# Patient Record
Sex: MALE | Race: AMERICAN INDIAN OR ALASKA NATIVE | URBAN - METROPOLITAN AREA
[De-identification: names, ages, dates, MRNs, and addresses within clinical notes are randomized per-mention and may not be internally consistent; named-entity substitution may affect disease eponyms.]

---

## 2021-11-23 ENCOUNTER — OFFICE VISIT (OUTPATIENT)
Dept: URBAN - METROPOLITAN AREA CLINIC 85 | Facility: CLINIC | Age: 71
End: 2021-11-23
Payer: MEDICARE

## 2021-11-23 DIAGNOSIS — H43.813 VITREOUS DEGENERATION, BILATERAL: ICD-10-CM

## 2021-11-23 PROCEDURE — 92134 CPTRZ OPH DX IMG PST SGM RTA: CPT | Performed by: OPTOMETRIST

## 2021-11-23 PROCEDURE — 99204 OFFICE O/P NEW MOD 45 MIN: CPT | Performed by: OPTOMETRIST

## 2021-11-23 ASSESSMENT — KERATOMETRY
OS: 42.13
OD: 42.50

## 2021-11-23 ASSESSMENT — VISUAL ACUITY
OD: 20/25
OS: 20/25

## 2021-11-23 ASSESSMENT — INTRAOCULAR PRESSURE
OD: 18
OS: 17

## 2021-11-23 NOTE — IMPRESSION/PLAN
Impression: Age-related nuclear cataract, bilateral: H25.13. Plan: Discussed cataract findings. Discussed option of ce/iol OU. Discussed risks, potential benefits, potential complications, and alternatives to surgery. Patient desires to have surgery.   Recommend CE w/IOL consult with Dr. Yissel Baldwin

## 2021-12-30 ENCOUNTER — OFFICE VISIT (OUTPATIENT)
Dept: URBAN - METROPOLITAN AREA CLINIC 85 | Facility: CLINIC | Age: 71
End: 2021-12-30
Payer: MEDICARE

## 2021-12-30 DIAGNOSIS — H25.12 AGE-RELATED NUCLEAR CATARACT, LEFT EYE: ICD-10-CM

## 2021-12-30 PROCEDURE — 99203 OFFICE O/P NEW LOW 30 MIN: CPT | Performed by: OPHTHALMOLOGY

## 2021-12-30 RX ORDER — KETOROLAC TROMETHAMINE 5 MG/ML
0.5 % SOLUTION OPHTHALMIC
Qty: 5 | Refills: 1 | Status: ACTIVE
Start: 2021-12-30

## 2021-12-30 ASSESSMENT — KERATOMETRY
OS: 42.13
OD: 42.50

## 2021-12-30 ASSESSMENT — VISUAL ACUITY
OS: 20/25
OD: 20/25

## 2021-12-30 ASSESSMENT — INTRAOCULAR PRESSURE
OD: 17
OS: 15

## 2021-12-30 NOTE — IMPRESSION/PLAN
Impression: Age-related nuclear cataract, bilateral: H25.13. Plan:  Discussed cataracts with patient. Discussed treatment options. Surgical treatment is recommended. Surgical risks and benefits discussed. Patient elects surgical treatment. Recommend surgery OU, OD first. ORA, Aim OD: plano. Aim OS: -2.50.   Dexycu will be used and [facility] [facility]

## 2022-02-22 ENCOUNTER — ADULT PHYSICAL (OUTPATIENT)
Dept: URBAN - METROPOLITAN AREA CLINIC 85 | Facility: CLINIC | Age: 72
End: 2022-02-22
Payer: MEDICARE

## 2022-02-22 DIAGNOSIS — Z01.818 ENCOUNTER FOR OTHER PREPROCEDURAL EXAMINATION: Primary | ICD-10-CM

## 2022-02-22 PROCEDURE — 99203 OFFICE O/P NEW LOW 30 MIN: CPT | Performed by: NURSE PRACTITIONER

## 2022-02-22 RX ORDER — TAMSULOSIN HYDROCHLORIDE 0.4 MG/1
0.4 MG CAPSULE ORAL
Qty: 0 | Refills: 0 | Status: ACTIVE
Start: 2022-02-22

## 2022-02-22 RX ORDER — ASPIRIN 81 MG/81MG
81 MG CAPSULE ORAL
Qty: 0 | Refills: 0 | Status: ACTIVE
Start: 2022-02-22

## 2022-02-22 RX ORDER — BENZONATATE 100 MG/1
100 MG CAPSULE ORAL
Qty: 0 | Refills: 0 | Status: ACTIVE
Start: 2022-02-22

## 2022-03-02 ENCOUNTER — SURGERY (OUTPATIENT)
Dept: URBAN - METROPOLITAN AREA SURGERY 55 | Facility: SURGERY | Age: 72
End: 2022-03-02
Payer: MEDICARE

## 2022-03-02 DIAGNOSIS — H25.13 AGE-RELATED NUCLEAR CATARACT, BILATERAL: Primary | ICD-10-CM

## 2022-03-02 PROCEDURE — 66984 XCAPSL CTRC RMVL W/O ECP: CPT | Performed by: OPHTHALMOLOGY

## 2022-03-03 ENCOUNTER — POST-OPERATIVE VISIT (OUTPATIENT)
Dept: URBAN - METROPOLITAN AREA CLINIC 85 | Facility: CLINIC | Age: 72
End: 2022-03-03
Payer: MEDICARE

## 2022-03-03 PROCEDURE — 99024 POSTOP FOLLOW-UP VISIT: CPT | Performed by: OPHTHALMOLOGY

## 2022-03-03 ASSESSMENT — INTRAOCULAR PRESSURE: OD: 21

## 2022-03-03 NOTE — IMPRESSION/PLAN
Impression: S/P Cataract Extraction by phacoemulsification with IOL placement OD - 1 Day. Encounter for surgical aftercare following surgery on a sense organ  Z48.810. Plan: The patient was instructed to contact their eye care provider ASAP if there should be any decrease in vision, pain, or any worsening of condition.  Use Ketorolac QID OD as directed

## 2022-03-09 ENCOUNTER — POST-OPERATIVE VISIT (OUTPATIENT)
Dept: URBAN - METROPOLITAN AREA CLINIC 85 | Facility: CLINIC | Age: 72
End: 2022-03-09
Payer: MEDICARE

## 2022-03-09 DIAGNOSIS — Z48.810 ENCOUNTER FOR SURGICAL AFTERCARE FOLLOWING SURGERY ON A SENSE ORGAN: Primary | ICD-10-CM

## 2022-03-09 PROCEDURE — 99024 POSTOP FOLLOW-UP VISIT: CPT | Performed by: OPTOMETRIST

## 2022-03-09 ASSESSMENT — INTRAOCULAR PRESSURE
OD: 16
OS: 16

## 2022-03-09 ASSESSMENT — VISUAL ACUITY
OS: 20/25
OD: 20/20

## 2022-03-09 NOTE — IMPRESSION/PLAN
Impression: S/P Cataract Extraction by phacoemulsification with IOL placement OD - 7 Days. Encounter for surgical aftercare following surgery on a sense organ  Z48.810. Plan: RTC as scheduled or ASAP if pain, decrease in [place] or worsening of any condition.  --Continue Ketorolac 0.5% QID

## 2022-03-16 ENCOUNTER — SURGERY (OUTPATIENT)
Dept: URBAN - METROPOLITAN AREA SURGERY 55 | Facility: SURGERY | Age: 72
End: 2022-03-16
Payer: MEDICARE

## 2022-03-16 PROCEDURE — 66984 XCAPSL CTRC RMVL W/O ECP: CPT | Performed by: OPHTHALMOLOGY

## 2022-03-17 ENCOUNTER — POST-OPERATIVE VISIT (OUTPATIENT)
Dept: URBAN - METROPOLITAN AREA CLINIC 85 | Facility: CLINIC | Age: 72
End: 2022-03-17
Payer: MEDICARE

## 2022-03-17 DIAGNOSIS — Z96.1 PRESENCE OF INTRAOCULAR LENS: Primary | ICD-10-CM

## 2022-03-17 PROCEDURE — 99024 POSTOP FOLLOW-UP VISIT: CPT | Performed by: OPHTHALMOLOGY

## 2022-03-17 ASSESSMENT — INTRAOCULAR PRESSURE: OS: 13

## 2022-03-17 NOTE — IMPRESSION/PLAN
Impression: S/P Cataract Extraction by phacoemulsification with IOL placement OS - 1 Day. Presence of intraocular lens  Z96.1. Plan: Continue Ketorolac QID. Patient will RTC as scheduled or ASAP if there should be any decrease in vision, pain, or any worsening of condition.

## 2022-04-20 ENCOUNTER — POST-OPERATIVE VISIT (OUTPATIENT)
Dept: URBAN - METROPOLITAN AREA CLINIC 85 | Facility: CLINIC | Age: 72
End: 2022-04-20
Payer: MEDICARE

## 2022-04-20 DIAGNOSIS — Z96.1 PRESENCE OF INTRAOCULAR LENS: Primary | ICD-10-CM

## 2022-04-20 PROCEDURE — 99024 POSTOP FOLLOW-UP VISIT: CPT | Performed by: OPHTHALMOLOGY

## 2022-04-20 RX ORDER — PREDNISOLONE ACETATE 10 MG/ML
1 % SUSPENSION/ DROPS OPHTHALMIC
Qty: 1 | Refills: 1 | Status: ACTIVE
Start: 2022-04-20

## 2022-04-20 ASSESSMENT — VISUAL ACUITY
OS: 20/20
OD: 20/20

## 2022-04-20 ASSESSMENT — INTRAOCULAR PRESSURE
OD: 15
OS: 13

## 2022-04-20 NOTE — IMPRESSION/PLAN
Impression: S/P Cataract Extraction by phacoemulsification with IOL placement OS - 35 Days. Presence of intraocular lens  Z96.1. Plan: Initiate prednisolone OU QID for 1 week, TID for 1 week,  and QD for 1 week.   follow up then in 3 weeks

## 2022-05-11 NOTE — IMPRESSION/PLAN
Impression: S/P Cataract Extraction by phacoemulsification with IOL placement OS - 56 Days. Presence of intraocular lens  Z96.1. Plan: The patient was instructed to contact their eye care provider ASAP if there should be any decrease in vision, pain, or any worsening of condition. Patient elects to use OTC readers.

## 2022-11-08 NOTE — IMPRESSION/PLAN
Impression: Puckering of macula, bilateral: H35.373. Plan: Discussed findings with patient. Continue to monitor.

## 2022-11-08 NOTE — IMPRESSION/PLAN
Impression: Pinguecula, bilateral: H11.153. Plan: Discussed findings with patient. Recommended UV protection and Artificial Tears QID OU. If symptoms continue to become more bothersome, consider surgical intervention. Continue to monitor and follow up in 2 months.

## 2022-11-08 NOTE — IMPRESSION/PLAN
Impression: Type 2 diabetes mellitus w/o complication: S73.2. Plan: No diabetic retinopathy. Recommend yearly diabetic eye exam. Discussed with patient importance of good blood sugar control with regular visits with PCP, compliance with medications, healthy diet and daily exercise.

## 2023-01-10 NOTE — IMPRESSION/PLAN
Impression: Pinguecula, bilateral: H11.153. Plan: Discussed findings with patient. Recommended UV protection and Artificial Tears QID OU. If symptoms continue to become more bothersome, consider surgical intervention.  Continue to monitor and follow up in 6 months